# Patient Record
Sex: MALE | Race: BLACK OR AFRICAN AMERICAN | URBAN - METROPOLITAN AREA
[De-identification: names, ages, dates, MRNs, and addresses within clinical notes are randomized per-mention and may not be internally consistent; named-entity substitution may affect disease eponyms.]

---

## 2019-03-13 ENCOUNTER — APPOINTMENT (OUTPATIENT)
Age: 59
Setting detail: DERMATOLOGY
End: 2019-03-14

## 2019-03-13 VITALS
SYSTOLIC BLOOD PRESSURE: 126 MMHG | WEIGHT: 250 LBS | DIASTOLIC BLOOD PRESSURE: 80 MMHG | HEART RATE: 60 BPM | HEIGHT: 72 IN

## 2019-03-13 DIAGNOSIS — L73.1 PSEUDOFOLLICULITIS BARBAE: ICD-10-CM

## 2019-03-13 DIAGNOSIS — L81.0 POSTINFLAMMATORY HYPERPIGMENTATION: ICD-10-CM

## 2019-03-13 PROCEDURE — OTHER MIPS QUALITY: OTHER

## 2019-03-13 PROCEDURE — OTHER PATIENT SPECIFIC COUNSELING: OTHER

## 2019-03-13 PROCEDURE — OTHER PRESCRIPTION: OTHER

## 2019-03-13 PROCEDURE — OTHER TREATMENT REGIMEN: OTHER

## 2019-03-13 PROCEDURE — OTHER COUNSELING: OTHER

## 2019-03-13 PROCEDURE — 99202 OFFICE O/P NEW SF 15 MIN: CPT

## 2019-03-13 RX ORDER — HYDROCORTISONE 25 MG/G
CREAM TOPICAL
Qty: 1 | Refills: 3 | Status: ERX | COMMUNITY
Start: 2019-03-13

## 2019-03-13 ASSESSMENT — LOCATION DETAILED DESCRIPTION DERM
LOCATION DETAILED: LEFT SUPERIOR PARIETAL SCALP
LOCATION DETAILED: LEFT MEDIAL FRONTAL SCALP

## 2019-03-13 ASSESSMENT — LOCATION ZONE DERM: LOCATION ZONE: SCALP

## 2019-03-13 ASSESSMENT — LOCATION SIMPLE DESCRIPTION DERM
LOCATION SIMPLE: LEFT SCALP
LOCATION SIMPLE: SCALP

## 2019-03-13 ASSESSMENT — SEVERITY ASSESSMENT: SEVERITY: MILD

## 2019-03-13 ASSESSMENT — BSA RASH: BSA RASH: 1

## 2019-03-13 NOTE — PROCEDURE: TREATMENT REGIMEN
Detail Level: Zone
Initiate Treatment: Bleaching cream with hydroquinone 8% twice daily to affected areas on the scalp

## 2019-03-13 NOTE — PROCEDURE: PATIENT SPECIFIC COUNSELING
Explained etiology of condition to the patient including that this is due to an inflammation of the hair follicles from shaving. Instructed the patient to apply hydrocortisone 2.5% cream after shaving to prevent and soothe irritation. The patient is agreeable and will follow up in three months.

## 2019-03-13 NOTE — PROCEDURE: PATIENT SPECIFIC COUNSELING
ExpIned etiology of condition to the patient including that hyperpigmentation can be due to irritation from shaving. Discussed treatment options with the patient including topical bleaching creams to promote lightening of hyperpigmented areas. Explained to the patient that such medication will be an out of pocket expense as it is considered cosmetic. The patient expresses understanding. Compounded bleaching cream with hydroquinone 8% has been sent to [facility] with 3 refills. ExpIned etiology of condition to the patient including that hyperpigmentation can be due to irritation from shaving. Discussed treatment options with the patient including topical bleaching creams to promote lightening of hyperpigmented areas. Explained to the patient that such medication will be an out of pocket expense as it is considered cosmetic. The patient expresses understanding. Compounded bleaching cream with hydroquinone 8% has been sent to [facility] with 3 refills.

## 2019-08-06 ENCOUNTER — APPOINTMENT (OUTPATIENT)
Dept: URBAN - METROPOLITAN AREA CLINIC 286 | Age: 59
Setting detail: DERMATOLOGY
End: 2019-08-08

## 2019-08-06 VITALS
SYSTOLIC BLOOD PRESSURE: 112 MMHG | HEIGHT: 69 IN | DIASTOLIC BLOOD PRESSURE: 71 MMHG | WEIGHT: 249 LBS | HEART RATE: 87 BPM

## 2019-08-06 DIAGNOSIS — L81.0 POSTINFLAMMATORY HYPERPIGMENTATION: ICD-10-CM

## 2019-08-06 PROCEDURE — OTHER COUNSELING: OTHER

## 2019-08-06 PROCEDURE — OTHER MIPS QUALITY: OTHER

## 2019-08-06 PROCEDURE — 99213 OFFICE O/P EST LOW 20 MIN: CPT

## 2019-08-06 PROCEDURE — OTHER TREATMENT REGIMEN: OTHER

## 2019-08-06 PROCEDURE — OTHER PATIENT SPECIFIC COUNSELING: OTHER

## 2019-08-06 ASSESSMENT — LOCATION ZONE DERM: LOCATION ZONE: SCALP

## 2019-08-06 ASSESSMENT — LOCATION DETAILED DESCRIPTION DERM
LOCATION DETAILED: MID-FRONTAL SCALP
LOCATION DETAILED: LEFT SUPERIOR PARIETAL SCALP

## 2019-08-06 ASSESSMENT — BSA RASH: BSA RASH: 2

## 2019-08-06 ASSESSMENT — LOCATION SIMPLE DESCRIPTION DERM
LOCATION SIMPLE: ANTERIOR SCALP
LOCATION SIMPLE: SCALP

## 2019-08-06 ASSESSMENT — SEVERITY ASSESSMENT: SEVERITY: MILD

## 2019-08-06 NOTE — HPI: RASH
What Type Of Note Output Would You Prefer (Optional)?: Standard Output
How Severe Is Your Rash?: mild
Is This A New Presentation, Or A Follow-Up?: Follow Up Rash
Additional History: Patient has been using hydrocortisone as directed and bumps have resolved. The patient’s main concern is discoloration. Pain 0/10.

## 2019-08-06 NOTE — PROCEDURE: TREATMENT REGIMEN
Discontinue Regimen: Hydroquinone 8%
Detail Level: Zone
Initiate Treatment: Compounded bleaching cream with hydroquinone 4%

## 2019-08-06 NOTE — PROCEDURE: PATIENT SPECIFIC COUNSELING
Detail Level: Simple
Explained to the patient that main treatment route for hyperpigmentation would be bleaching creams. Additional treatment options include laser treatments and chemical peels, however, these are considered cosmetic and would be an out of pocket expense. Discussed potentially lowering strength of hydroquinone to 4% to reduce irritation. The patient is agreeable with medication change and will follow up in three months for further evaluation.

## 2019-09-16 ENCOUNTER — RX ONLY (RX ONLY)
Age: 59
End: 2019-09-16

## 2019-09-16 RX ORDER — HYDROCORTISONE 25 MG/G
CREAM TOPICAL
Qty: 1 | Refills: 3 | Status: ERX | COMMUNITY
Start: 2019-09-16